# Patient Record
Sex: FEMALE | Race: WHITE
[De-identification: names, ages, dates, MRNs, and addresses within clinical notes are randomized per-mention and may not be internally consistent; named-entity substitution may affect disease eponyms.]

---
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
## 2019-03-14 ENCOUNTER — HOSPITAL ENCOUNTER (EMERGENCY)
Dept: HOSPITAL 61 - ER | Age: 19
Discharge: HOME | End: 2019-03-14
Payer: COMMERCIAL

## 2019-03-14 VITALS — WEIGHT: 132 LBS | HEIGHT: 61 IN | BODY MASS INDEX: 24.92 KG/M2

## 2019-03-14 VITALS — DIASTOLIC BLOOD PRESSURE: 74 MMHG | SYSTOLIC BLOOD PRESSURE: 114 MMHG

## 2019-03-14 DIAGNOSIS — J02.0: Primary | ICD-10-CM

## 2019-03-14 DIAGNOSIS — B95.5: ICD-10-CM

## 2019-03-14 DIAGNOSIS — F41.9: ICD-10-CM

## 2019-03-14 DIAGNOSIS — M79.18: ICD-10-CM

## 2019-03-14 LAB
INFLUENZA A PATIENT: NEGATIVE
INFLUENZA B PATIENT: POSITIVE

## 2019-03-14 PROCEDURE — 87804 INFLUENZA ASSAY W/OPTIC: CPT

## 2019-03-14 PROCEDURE — 99283 EMERGENCY DEPT VISIT LOW MDM: CPT

## 2019-03-14 PROCEDURE — 87880 STREP A ASSAY W/OPTIC: CPT

## 2019-03-14 NOTE — PHYS DOC
Past Medical History


Past Medical History:  Anxiety


Additional Past Medical Histor:  ADHD


Past Surgical History:  No Surgical History


Alcohol Use:  None


Drug Use:  None





Adult General


Chief Complaint


Chief Complaint:  SORE THROAT





HPI


HPI





Patient is a 19  year old female who presents to the emergency room today with 

complaints of a sore throat for the last 4 days. Patient states that her entire 

body has felt achy for the last 2 days. She denies any nausea, vomiting, 

diarrhea, abdominal pain, cough, chest pain, shortness of breath, or measured 

fever. Patient states she has felt hot at times, and currently complains of 

bilateral ear pain.





Review of Systems


Review of Systems





Constitutional: reports tactile fevers, fatigue, adn body aches


Eyes: Denies changes


HENT: Denies nasal congestion; see HPI


Respiratory: Denies cough or shortness of breath []


Cardiovascular: No additional information not addressed in HPI []


GI: Denies abdominal pain, nausea, vomiting,  or diarrhea []


Musculoskeletal: reports body aches


Integument: Denies rash or skin lesions []


Neurologic: Denies headache, focal weakness or sensory changes []





Allergies


Allergies





Allergies








Coded Allergies Type Severity Reaction Last Updated Verified


 


  No Known Drug Allergies    1/14/18 No











Physical Exam


Physical Exam





Constitutional: Well developed, well nourished, no acute distress, non-toxic 

appearance. []


HENT: Normocephalic, atraumatic, bilateral external ears normal, bilateral TMs 

normal, mild erythema of posterior pharynx, oropharynx moist, no oral exudates, 

nose normal. []


Eyes:  conjunctiva normal, no discharge. [] 


Neck: Normal range of motion, no tenderness, supple, no stridor. [] 


Cardiovascular:Heart rate regular rhythm, no murmur []


Lungs & Thorax:  Bilateral breath sounds clear to auscultation []


Skin: Warm, dry, no erythema, no rash. [] 


Neurologic: Alert and oriented X 3, no focal deficits noted. []


Psychologic: Affect normal, judgement normal, mood normal. []





Current Patient Data


Vital Signs





 Vital Signs








  Date Time  Temp Pulse Resp B/P (MAP) Pulse Ox O2 Delivery O2 Flow Rate FiO2


 


3/14/19 11:27 99.4 100 16 114/74 (87) 100 Room Air  





 99.4       








Lab Values





 Laboratory Tests








Test


 3/14/19


12:07


 


Group A Streptococcus Rapid


 Positive


(NEGATIVE)











EKG


EKG


[]





Radiology/Procedures


Radiology/Procedures


[]





Course & Med Decision Making


Course & Med Decision Making


Pertinent Labs and Imaging studies reviewed. (See chart for details)


Dx: strep pharyngitis


rapid strep positive. Rx for amoxicillin 500 bid x10 d. 


Patient verbalized an understanding of home care, medications, follow-up, and 

return to ED instructions and was in agreement with the plan of care.


[]





Dragon Disclaimer


Dragon Disclaimer


This electronic medical record was generated, in whole or in part, using a 

voice recognition dictation system.





Departure


Departure


Impression:  


 Primary Impression:  


 Strep pharyngitis


Disposition:  01 HOME, SELF-CARE


Condition:  STABLE


Referrals:  


MARTA GROSS (PCP)


Patient Instructions:  Strep Throat, Easy-to-Read





Additional Instructions:  


Fill prescription and use as directed. Recommend warm salt water gargles as 

needed for relief of discomfort. Alternate Tylenol and ibuprofen as needed for 

fever/pain. Discard your toothbrush tomorrow and begin using a new toothbrush. 

Follow-up with primary care doctor if symptoms persist. Return to the ER if 

symptoms worsen.


Scripts


Amoxicillin (AMOXICILLIN) 500 Mg Tablet


500 MG PO BID for 10 Days, #20 TAB 0 Refills


   Prov: LOWELL ABDI         3/14/19











LOWELL ABDI Mar 14, 2019 11:56